# Patient Record
Sex: MALE | NOT HISPANIC OR LATINO | Employment: FULL TIME | URBAN - METROPOLITAN AREA
[De-identification: names, ages, dates, MRNs, and addresses within clinical notes are randomized per-mention and may not be internally consistent; named-entity substitution may affect disease eponyms.]

---

## 2018-12-19 ENCOUNTER — HOSPITAL ENCOUNTER (EMERGENCY)
Facility: OTHER | Age: 38
Discharge: HOME | End: 2018-12-19
Attending: EMERGENCY MEDICINE

## 2018-12-19 VITALS
RESPIRATION RATE: 17 BRPM | SYSTOLIC BLOOD PRESSURE: 142 MMHG | DIASTOLIC BLOOD PRESSURE: 70 MMHG | OXYGEN SATURATION: 100 % | HEART RATE: 92 BPM

## 2018-12-19 DIAGNOSIS — T78.2XXA ANAPHYLAXIS, INITIAL ENCOUNTER: Primary | ICD-10-CM

## 2018-12-19 PROCEDURE — S0028 INJECTION, FAMOTIDINE, 20 MG: HCPCS | Performed by: EMERGENCY MEDICINE

## 2018-12-19 PROCEDURE — 96361 HYDRATE IV INFUSION ADD-ON: CPT

## 2018-12-19 PROCEDURE — 96375 TX/PRO/DX INJ NEW DRUG ADDON: CPT

## 2018-12-19 PROCEDURE — 63600175 PHARM REV CODE 636 W HCPCS: Performed by: EMERGENCY MEDICINE

## 2018-12-19 PROCEDURE — 96374 THER/PROPH/DIAG INJ IV PUSH: CPT

## 2018-12-19 PROCEDURE — 99284 EMERGENCY DEPT VISIT MOD MDM: CPT | Mod: 25

## 2018-12-19 PROCEDURE — 25000003 PHARM REV CODE 250: Performed by: EMERGENCY MEDICINE

## 2018-12-19 RX ORDER — FAMOTIDINE 10 MG/ML
20 INJECTION INTRAVENOUS
Status: COMPLETED | OUTPATIENT
Start: 2018-12-19 | End: 2018-12-19

## 2018-12-19 RX ORDER — METFORMIN HYDROCHLORIDE 500 MG/1
500 TABLET ORAL 2 TIMES DAILY WITH MEALS
COMMUNITY

## 2018-12-19 RX ORDER — VALSARTAN 40 MG/1
40 TABLET ORAL DAILY
COMMUNITY

## 2018-12-19 RX ORDER — EPINEPHRINE 0.3 MG/.3ML
1 INJECTION SUBCUTANEOUS
Qty: 2 EACH | Refills: 1 | Status: SHIPPED | OUTPATIENT
Start: 2018-12-19 | End: 2019-12-19

## 2018-12-19 RX ORDER — PREDNISONE 20 MG/1
60 TABLET ORAL DAILY
Qty: 12 TABLET | Refills: 0 | Status: SHIPPED | OUTPATIENT
Start: 2018-12-19 | End: 2018-12-23

## 2018-12-19 RX ORDER — PREDNISONE 20 MG/1
60 TABLET ORAL
Status: COMPLETED | OUTPATIENT
Start: 2018-12-19 | End: 2018-12-19

## 2018-12-19 RX ORDER — FAMOTIDINE 20 MG/1
20 TABLET, FILM COATED ORAL 2 TIMES DAILY
Qty: 20 TABLET | Refills: 0 | Status: SHIPPED | OUTPATIENT
Start: 2018-12-19 | End: 2019-12-19

## 2018-12-19 RX ORDER — SODIUM CHLORIDE 9 MG/ML
1000 INJECTION, SOLUTION INTRAVENOUS
Status: COMPLETED | OUTPATIENT
Start: 2018-12-19 | End: 2018-12-19

## 2018-12-19 RX ORDER — DIPHENHYDRAMINE HYDROCHLORIDE 50 MG/ML
25 INJECTION INTRAMUSCULAR; INTRAVENOUS
Status: COMPLETED | OUTPATIENT
Start: 2018-12-19 | End: 2018-12-19

## 2018-12-19 RX ORDER — CLINDAMYCIN HYDROCHLORIDE 150 MG/1
300 CAPSULE ORAL EVERY 8 HOURS
Qty: 21 CAPSULE | Refills: 0 | Status: SHIPPED | OUTPATIENT
Start: 2018-12-19 | End: 2018-12-26

## 2018-12-19 RX ORDER — EPINEPHRINE 1 MG/ML
0.3 INJECTION, SOLUTION INTRACARDIAC; INTRAMUSCULAR; INTRAVENOUS; SUBCUTANEOUS
Status: COMPLETED | OUTPATIENT
Start: 2018-12-19 | End: 2018-12-19

## 2018-12-19 RX ADMIN — DIPHENHYDRAMINE HYDROCHLORIDE 25 MG: 50 INJECTION, SOLUTION INTRAMUSCULAR; INTRAVENOUS at 07:12

## 2018-12-19 RX ADMIN — EPINEPHRINE 0.3 MG: 1 INJECTION, SOLUTION, CONCENTRATE INTRAVENOUS at 07:12

## 2018-12-19 RX ADMIN — SODIUM CHLORIDE 1000 ML: 0.9 INJECTION, SOLUTION INTRAVENOUS at 07:12

## 2018-12-19 RX ADMIN — PREDNISONE 60 MG: 20 TABLET ORAL at 07:12

## 2018-12-19 RX ADMIN — FAMOTIDINE 20 MG: 10 INJECTION, SOLUTION INTRAVENOUS at 07:12

## 2018-12-20 NOTE — ED NOTES
"Pt reports "It feels like the epi has kicked in." Reports improvement in oral swelling. Lips appear less swollen. Pt able to speak in full sentences, maintain secretions, respirations even and unlabored.  "

## 2018-12-20 NOTE — ED PROVIDER NOTES
Encounter Date: 12/19/2018    SCRIBE #1 NOTE: I, Uche Kolb, am scribing for, and in the presence of, Dr. Osborn .       History     Chief Complaint   Patient presents with    Allergic Reaction     pt states reaction to amoxcillin. pt reports anaphylaxis last time he took amoxcillin. pt reports swallowing difficulty and oral swelling      Time seen by provider: 7:44 PM    This is a 38 y.o. male who presents with complaint of allergic reaction after taking amoxicillin (350 mg) approximately twenty minutes ago. Patient reports known allergy to amoxicillin, which causes itching. He states he usually takes it with Benadryl with no serious complications. He is currently experiencing anxiety, mild SOB, trouble swallowing, itching of the eyes, and lip swelling. He denies significant past medical history or use of daily prescription medications.         The history is provided by the patient.     Review of patient's allergies indicates:   Allergen Reactions    Amoxicillin Anaphylaxis     Past Medical History:   Diagnosis Date    Arthritis     Diabetes mellitus     Hypertension     Neuropathy      Past Surgical History:   Procedure Laterality Date    TONSILLECTOMY       History reviewed. No pertinent family history.  Social History     Tobacco Use    Smoking status: Current Some Day Smoker    Tobacco comment: 1-2 cigs/year   Substance Use Topics    Alcohol use: Yes     Comment: 1 x month    Drug use: Not on file     Review of Systems   Constitutional: Negative for chills and fever.   HENT: Positive for trouble swallowing. Negative for congestion, rhinorrhea and sore throat.         Positive for lip swelling.    Eyes: Positive for itching (bilateral).   Respiratory: Positive for shortness of breath. Negative for cough.    Cardiovascular: Negative for chest pain.   Gastrointestinal: Negative for abdominal pain, diarrhea, nausea and vomiting.   Genitourinary: Negative for decreased urine volume and dysuria.    Musculoskeletal: Negative for back pain.   Skin: Negative for rash.   Neurological: Negative for dizziness and weakness.   Psychiatric/Behavioral: Negative for confusion. The patient is nervous/anxious.        Physical Exam     Initial Vitals   BP Pulse Resp Temp SpO2   12/19/18 1950 12/19/18 1940 12/19/18 1940 -- 12/19/18 1940   (!) 169/81 (!) 122 20  99 %      MAP       --                Physical Exam    Nursing note and vitals reviewed.  Constitutional: He appears well-developed and well-nourished. No distress.   HENT:   Head: Normocephalic and atraumatic.   No significant lip or tongue swelling visualized.    Eyes: Conjunctivae and EOM are normal.   Neck: Normal range of motion. Neck supple.   Cardiovascular: Regular rhythm, normal heart sounds and intact distal pulses. Tachycardia present.    Pulmonary/Chest: Breath sounds normal. No respiratory distress. He has no wheezes. He has no rhonchi. He has no rales.   Breathing comfortably.    Musculoskeletal: Normal range of motion. He exhibits no edema.   Neurological: He is alert and oriented to person, place, and time. He has normal strength. No cranial nerve deficit or sensory deficit.   Skin: Skin is warm and dry. Rash noted.   Generalized urticaria to upper extremities and torso.    Psychiatric: His behavior is normal. Judgment and thought content normal. His mood appears anxious.   Anxious appearing.          ED Course   Critical Care  Date/Time: 12/19/2018 7:57 PM  Performed by: Lior Osborn DO  Authorized by: Lior Osborn DO   Direct patient critical care time: 5 minutes  Ordering / reviewing critical care time: 5 minutes  Documentation critical care time: 5 minutes  Total critical care time (exclusive of procedural time) : 15 minutes        Labs Reviewed - No data to display       Imaging Results    None          Medical Decision Making:   ED Management:  38-year-old male with known allergy to amoxicillin took amoxicillin again this  evening.  Patient's symptoms suggestive of anaphylaxis.  No shock at this time.  Will treat with epinephrine as well as Benadryl, steroids and fluids and Pepcid.  Will observe the patient.    7:55 p.m. on re-evaluation patient's heart rate is now down to 107.  He states he is already feeling better and his lips are improving.  Will continue to observe.      9:14 PM On re-evaluation, patient feeling much better. on re-evaluation patient's heart rate now is 97.   No longer has puffy appearing face.    11:00 p.m. after multiple episodes re-evaluation patient continues to do well.  Vitals are normal.  Patient is ready for discharge. It has been approximately 4 hr since the patient presented.  He is a very complaint reason the patient feel he safely discharged home.  Discharge with EpiPen as well as steroids.    Patient discharged home in stable condition. Diagnosis and treatment plan explained to patient. I have answered all questions and the patient is satisfied with the plan of care. The patient demonstrates understanding of the care plan. This is the extent to the patients complaints today here in the emergency department.                Scribe Attestation:   Scribe #1: I performed the above scribed service and the documentation accurately describes the services I performed. I attest to the accuracy of the note.    Attending Attestation:           Physician Attestation for Scribe:  Physician Attestation Statement for Scribe #1: I, Dr. Osborn, reviewed documentation, as scribed by Uche Kolb  in my presence, and it is both accurate and complete.                    Clinical Impression:     1. Anaphylaxis, initial encounter                                   Lior Osborn, DO  12/19/18 [id number]

## 2018-12-20 NOTE — ED NOTES
Pt with even, unlabored respirations, in no acute distress. States he feels much better. Asking how much longer he will need to be monitored.

## 2018-12-20 NOTE — ED NOTES
Pt remains with respirations even and unlabored, appears in no acute distress. Remains on pulse ox, BP cycling q 10.
